# Patient Record
Sex: FEMALE | URBAN - METROPOLITAN AREA
[De-identification: names, ages, dates, MRNs, and addresses within clinical notes are randomized per-mention and may not be internally consistent; named-entity substitution may affect disease eponyms.]

---

## 2023-12-02 ENCOUNTER — OUT OF OFFICE VISIT (OUTPATIENT)
Dept: URBAN - METROPOLITAN AREA SURGERY CENTER 15 | Facility: SURGERY CENTER | Age: 48
End: 2023-12-02

## 2024-01-09 ENCOUNTER — OFFICE VISIT (OUTPATIENT)
Dept: URBAN - METROPOLITAN AREA SURGERY CENTER 15 | Facility: SURGERY CENTER | Age: 49
End: 2024-01-09

## 2024-02-01 ENCOUNTER — DAC (OUTPATIENT)
Dept: URBAN - METROPOLITAN AREA SURGERY CENTER 15 | Facility: SURGERY CENTER | Age: 49
End: 2024-02-01
Payer: COMMERCIAL

## 2024-02-01 DIAGNOSIS — Z12.11 COLON CANCER SCREENING: ICD-10-CM

## 2024-02-01 PROCEDURE — 45378 DIAGNOSTIC COLONOSCOPY: CPT | Performed by: INTERNAL MEDICINE

## 2024-06-26 ENCOUNTER — DASHBOARD ENCOUNTERS (OUTPATIENT)
Age: 49
End: 2024-06-26

## 2024-06-26 ENCOUNTER — OFFICE VISIT (OUTPATIENT)
Dept: URBAN - METROPOLITAN AREA CLINIC 78 | Facility: CLINIC | Age: 49
End: 2024-06-26
Payer: COMMERCIAL

## 2024-06-26 ENCOUNTER — LAB OUTSIDE AN ENCOUNTER (OUTPATIENT)
Dept: URBAN - METROPOLITAN AREA CLINIC 78 | Facility: CLINIC | Age: 49
End: 2024-06-26

## 2024-06-26 VITALS
WEIGHT: 147.6 LBS | RESPIRATION RATE: 14 BRPM | HEIGHT: 65 IN | HEART RATE: 83 BPM | DIASTOLIC BLOOD PRESSURE: 71 MMHG | TEMPERATURE: 98.1 F | SYSTOLIC BLOOD PRESSURE: 125 MMHG | BODY MASS INDEX: 24.59 KG/M2

## 2024-06-26 DIAGNOSIS — K60.5 ANORECTAL FISTULA: ICD-10-CM

## 2024-06-26 DIAGNOSIS — K60.2 ANAL FISSURE: ICD-10-CM

## 2024-06-26 DIAGNOSIS — K64.8 INTERNAL HEMORRHOIDS: ICD-10-CM

## 2024-06-26 PROCEDURE — 99213 OFFICE O/P EST LOW 20 MIN: CPT | Performed by: INTERNAL MEDICINE

## 2024-06-26 PROCEDURE — 46600 DIAGNOSTIC ANOSCOPY SPX: CPT | Performed by: INTERNAL MEDICINE

## 2024-06-26 NOTE — PHYSICAL EXAM HENT:
Head,  normocephalic,  atraumatic,  Face,  Face within normal limits,  Ears,  External ears within normal limits,  Nose/Nasopharynx,  External nose  normal appearance,Mouth and Throat,  Oral cavity appearance norm

## 2024-06-26 NOTE — HPI-TODAY'S VISIT:
Patient is seen follow  up  She is reporting perianal discomfort and rectal bleeding periodic  She also feels alump in the pericanal area  She has had a break in the skin which she feels is getting bigger  She also notices seepage  She feels a bump in the anal area  BM : uses Magnesium supplement  prn  She feels her problems began after the colonoscopy   Reports hemorrhoids post child birth

## 2024-07-03 ENCOUNTER — OFFICE VISIT (OUTPATIENT)
Dept: URBAN - METROPOLITAN AREA CLINIC 78 | Facility: CLINIC | Age: 49
End: 2024-07-03

## 2024-07-23 ENCOUNTER — LAB OUTSIDE AN ENCOUNTER (OUTPATIENT)
Dept: URBAN - METROPOLITAN AREA CLINIC 78 | Facility: CLINIC | Age: 49
End: 2024-07-23

## 2024-08-09 ENCOUNTER — OFFICE VISIT (OUTPATIENT)
Dept: URBAN - METROPOLITAN AREA CLINIC 78 | Facility: CLINIC | Age: 49
End: 2024-08-09
Payer: COMMERCIAL

## 2024-08-09 DIAGNOSIS — K60.5 ANORECTAL FISTULA: ICD-10-CM

## 2024-08-09 PROCEDURE — 99213 OFFICE O/P EST LOW 20 MIN: CPT

## 2024-08-09 NOTE — HPI-TODAY'S VISIT:
48-year-old female, established patient of Dr. 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  Patient was advised to utilize a high-fiber diet, sitz bath's, Desitin, and keeping her rectum clean and dry.  An order for an MRI of the pelvis with and without contrast was placed to rule out fistula.  Patient presents today stating that the suggestions and prescriptions medication given at the last appointment have not been helping with her symptoms of the fistula.  She continues to have a burning sensation in her rectum as well as a puffing and swollen sensation where the fistula is located.  She is questioning whether or not she needs antibiotics.  She reports that her symptoms occur randomly and that she is able to walk 5 miles without any issues.  She is concerned about undergoing surgery because she does not want to have any complications of that.  She does report that a month after undergoing the colonoscopy in February is when she began to have symptoms of this fistula.  She reports that it initially started as fecal urgency  with subsequent production of bright red blood when going to the bathroom to produce a bowel movement.  She also reports that she felt a huge lump in her rectum however it was actually a tiny bump on looking at it.  Not long after that is when she began to notice that hole was forming.  She has been using hemorrhoidal creams with no relief.  She denies nausea, vomiting, fevers or chills, GERD like symptoms, dysphagia, or abdominal pain.  She does have a bowel movement once or twice a day without hematochezia or melena.  ~~ Prior Procedures & Imagin2024 colonoscopy with Dr. Merrill: Hemorrhoids.  Otherwise normal.  No specimens collected.  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  No significant fluid collection.

## 2024-08-12 PROBLEM — 72779005: Status: ACTIVE | Noted: 2024-08-12

## 2024-08-19 ENCOUNTER — OFFICE VISIT (OUTPATIENT)
Dept: URBAN - METROPOLITAN AREA CLINIC 23 | Facility: CLINIC | Age: 49
End: 2024-08-19